# Patient Record
Sex: MALE | Race: WHITE | NOT HISPANIC OR LATINO | Employment: FULL TIME | URBAN - METROPOLITAN AREA
[De-identification: names, ages, dates, MRNs, and addresses within clinical notes are randomized per-mention and may not be internally consistent; named-entity substitution may affect disease eponyms.]

---

## 2024-01-29 ENCOUNTER — OFFICE VISIT (OUTPATIENT)
Dept: CARDIOLOGY | Facility: CLINIC | Age: 34
End: 2024-01-29
Payer: COMMERCIAL

## 2024-01-29 VITALS
SYSTOLIC BLOOD PRESSURE: 138 MMHG | HEART RATE: 82 BPM | HEIGHT: 77 IN | OXYGEN SATURATION: 98 % | WEIGHT: 233.13 LBS | DIASTOLIC BLOOD PRESSURE: 95 MMHG | BODY MASS INDEX: 27.53 KG/M2

## 2024-01-29 DIAGNOSIS — R07.9 CHEST PAIN: Primary | ICD-10-CM

## 2024-01-29 DIAGNOSIS — R07.2 PRECORDIAL PAIN: ICD-10-CM

## 2024-01-29 DIAGNOSIS — R03.0 ELEVATED BP WITHOUT DIAGNOSIS OF HYPERTENSION: ICD-10-CM

## 2024-01-29 DIAGNOSIS — E78.49 OTHER HYPERLIPIDEMIA: ICD-10-CM

## 2024-01-29 PROCEDURE — 1159F MED LIST DOCD IN RCRD: CPT | Mod: CPTII,S$GLB,, | Performed by: NURSE PRACTITIONER

## 2024-01-29 PROCEDURE — 99999 PR PBB SHADOW E&M-EST. PATIENT-LVL IV: CPT | Mod: PBBFAC,,, | Performed by: NURSE PRACTITIONER

## 2024-01-29 PROCEDURE — 3008F BODY MASS INDEX DOCD: CPT | Mod: CPTII,S$GLB,, | Performed by: NURSE PRACTITIONER

## 2024-01-29 PROCEDURE — 93010 ELECTROCARDIOGRAM REPORT: CPT | Mod: S$GLB,,, | Performed by: INTERNAL MEDICINE

## 2024-01-29 PROCEDURE — 3080F DIAST BP >= 90 MM HG: CPT | Mod: CPTII,S$GLB,, | Performed by: NURSE PRACTITIONER

## 2024-01-29 PROCEDURE — 99203 OFFICE O/P NEW LOW 30 MIN: CPT | Mod: S$GLB,,, | Performed by: NURSE PRACTITIONER

## 2024-01-29 PROCEDURE — 3075F SYST BP GE 130 - 139MM HG: CPT | Mod: CPTII,S$GLB,, | Performed by: NURSE PRACTITIONER

## 2024-01-29 PROCEDURE — 93005 ELECTROCARDIOGRAM TRACING: CPT | Mod: S$GLB,,, | Performed by: NURSE PRACTITIONER

## 2024-01-29 PROCEDURE — 1160F RVW MEDS BY RX/DR IN RCRD: CPT | Mod: CPTII,S$GLB,, | Performed by: NURSE PRACTITIONER

## 2024-01-29 RX ORDER — ROSUVASTATIN CALCIUM 10 MG/1
10 TABLET, COATED ORAL DAILY
COMMUNITY

## 2024-01-29 RX ORDER — FENOFIBRATE 160 MG/1
160 TABLET ORAL DAILY
COMMUNITY
End: 2024-06-17

## 2024-01-29 NOTE — PROGRESS NOTES
Cardiology    1/29/2024  1:46 PM    Problem list  There is no problem list on file for this patient.      CC:  Chest pain    HPI:  Started on fenofibrate in September of last year and has noticed that chest pain has started when thirsty. Concerned about current meds leading to DM.  One grandfather has DM.  Father had double bypass in 50s. Hyperlipidemia on both sides of family. Fenofibrate was to assist with triglyceride control. Is on a low cholesterol diet.  Has increased fiber in diet.  Not very active. No tobacco, rare ETOH. Concerned about stomach pain and concerned about liver issues.  Had some mild aches that improved.  Had tests that did not suggest rhabdo.  No stress test in the past. BP has always been high end normal.    Medications  Current Outpatient Medications   Medication Sig Dispense Refill    fenofibrate 160 MG Tab Take 160 mg by mouth once daily.      rosuvastatin (CRESTOR) 10 MG tablet Take 10 mg by mouth once daily.       No current facility-administered medications for this visit.      Prior to Admission medications    Medication Sig Start Date End Date Taking? Authorizing Provider   fenofibrate 160 MG Tab Take 160 mg by mouth once daily.   Yes Provider, Historical   rosuvastatin (CRESTOR) 10 MG tablet Take 10 mg by mouth once daily.   Yes Provider, Historical         History  No past medical history on file.  No past surgical history on file.  Social History     Socioeconomic History    Marital status: Single   Occupational History     Employer: hospitals   Tobacco Use    Smoking status: Never    Smokeless tobacco: Never   Substance and Sexual Activity    Alcohol use: No    Drug use: No     Social Determinants of Health     Financial Resource Strain: Low Risk  (1/29/2024)    Overall Financial Resource Strain (CARDIA)     Difficulty of Paying Living Expenses: Not hard at all   Food Insecurity: No Food Insecurity (1/29/2024)    Hunger Vital Sign     Worried About Running Out of Food in the  Last Year: Never true     Ran Out of Food in the Last Year: Never true   Transportation Needs: No Transportation Needs (1/29/2024)    PRAPARE - Transportation     Lack of Transportation (Medical): No     Lack of Transportation (Non-Medical): No   Physical Activity: Insufficiently Active (1/29/2024)    Exercise Vital Sign     Days of Exercise per Week: 2 days     Minutes of Exercise per Session: 30 min   Stress: No Stress Concern Present (1/29/2024)    [employer] [place] [employer] - Occupational Stress Questionnaire     Feeling of Stress : Not at all   Social Connections: Unknown (1/29/2024)    Social Connection and Isolation Panel [NHANES]     Frequency of Communication with Friends and Family: More than three times a week     Frequency of Social Gatherings with Friends and Family: More than three times a week     Active Member of Clubs or Organizations: No     Attends Club or Organization Meetings: Never     Marital Status: Living with partner   Housing Stability: Low Risk  (1/29/2024)    Housing Stability Vital Sign     Unable to Pay for Housing in the Last Year: No     Number of Places Lived in the Last Year: 2     Unstable Housing in the Last Year: No         Allergies  Review of patient's allergies indicates:  No Known Allergies      Review of Systems   Review of Systems   Constitutional: Negative for diaphoresis and malaise/fatigue.   HENT: Negative.     Cardiovascular:  Positive for chest pain (left sided, does not travel). Negative for claudication, dyspnea on exertion, irregular heartbeat, leg swelling, near-syncope, orthopnea (sleeps on one pillow), palpitations, paroxysmal nocturnal dyspnea and syncope.   Respiratory:  Negative for shortness of breath.    Endocrine: Positive for polydipsia. Negative for polyphagia and polyuria.   Hematologic/Lymphatic: Does not bruise/bleed easily.   Gastrointestinal:  Negative for bloating, nausea and vomiting.   Genitourinary: Negative.    Neurological:   Positive for dizziness (sometimes associated with thirst). Negative for excessive daytime sleepiness, light-headedness, loss of balance and weakness.   Psychiatric/Behavioral:  The patient is not nervous/anxious.    Allergic/Immunologic: Negative.          Physical Exam  Wt Readings from Last 1 Encounters:   01/29/24 105.7 kg (233 lb 2.2 oz)     BP Readings from Last 3 Encounters:   01/29/24 (!) 138/95   12/27/23 133/83   09/05/13 131/84     Pulse Readings from Last 1 Encounters:   01/29/24 82     Body mass index is 27.65 kg/m².    Physical Exam  Vitals and nursing note reviewed.   Constitutional:       Appearance: Normal appearance.   HENT:      Head: Normocephalic and atraumatic.      Mouth/Throat:      Mouth: Mucous membranes are moist.   Eyes:      Pupils: Pupils are equal, round, and reactive to light.   Cardiovascular:      Rate and Rhythm: Normal rate and regular rhythm.      Pulses:           Radial pulses are 2+ on the right side and 2+ on the left side.        Dorsalis pedis pulses are 2+ on the right side and 2+ on the left side.        Posterior tibial pulses are 2+ on the right side and 2+ on the left side.      Heart sounds: No murmur heard.  Pulmonary:      Effort: Pulmonary effort is normal. No respiratory distress.      Breath sounds: Normal breath sounds.   Abdominal:      General: There is no distension.      Tenderness: There is no abdominal tenderness.   Musculoskeletal:      Cervical back: Normal range of motion.      Right lower leg: No edema.      Left lower leg: No edema.   Skin:     General: Skin is warm and dry.      Findings: No erythema.   Neurological:      General: No focal deficit present.      Mental Status: He is alert.   Psychiatric:         Mood and Affect: Mood normal.         Behavior: Behavior normal.             Problem List Items Addressed This Visit          Cardiac/Vascular    Precordial pain    Overview     Noted with stress and increased thirst  Has significant cardiac  history in family  History of hyperlipidemia  Await exercise stress  Labs to assist with risk stratification           Current Assessment & Plan     As above         Other hyperlipidemia    Overview     On fenofibrate and rosuvastatin  Unknown lipid baseline  Labs to assess and r/o any DM  Encourage low chol diet           Current Assessment & Plan     As above         Elevated BP without diagnosis of hypertension    Overview     Encourage low Na diet  Monitor BP  May need BP medications at NOV         Current Assessment & Plan     As above          Other Visit Diagnoses       Chest pain    -  Primary    Relevant Orders    IN OFFICE EKG 12-LEAD (to Muse)    HEMOGLOBIN A1C    LIPID PANEL    CBC W/ AUTO DIFFERENTIAL    COMPREHENSIVE METABOLIC PANEL    Exercise Stress - EKG    TSH    T4, FREE                @Erica Raygoza, DNP

## 2024-01-30 PROBLEM — R03.0 ELEVATED BP WITHOUT DIAGNOSIS OF HYPERTENSION: Status: ACTIVE | Noted: 2024-01-30

## 2024-01-30 PROBLEM — E78.49 OTHER HYPERLIPIDEMIA: Status: ACTIVE | Noted: 2024-01-30

## 2024-06-17 ENCOUNTER — TELEPHONE (OUTPATIENT)
Dept: SLEEP MEDICINE | Facility: CLINIC | Age: 34
End: 2024-06-17
Payer: COMMERCIAL

## 2024-06-17 ENCOUNTER — OFFICE VISIT (OUTPATIENT)
Dept: PRIMARY CARE CLINIC | Facility: CLINIC | Age: 34
End: 2024-06-17
Payer: COMMERCIAL

## 2024-06-17 VITALS
HEIGHT: 77 IN | BODY MASS INDEX: 27.1 KG/M2 | HEART RATE: 96 BPM | TEMPERATURE: 98 F | OXYGEN SATURATION: 98 % | RESPIRATION RATE: 16 BRPM | DIASTOLIC BLOOD PRESSURE: 90 MMHG | WEIGHT: 229.5 LBS | SYSTOLIC BLOOD PRESSURE: 120 MMHG

## 2024-06-17 DIAGNOSIS — Z76.89 ENCOUNTER TO ESTABLISH CARE: Primary | ICD-10-CM

## 2024-06-17 DIAGNOSIS — R06.81 APNEIC EPISODE: ICD-10-CM

## 2024-06-17 DIAGNOSIS — G47.00 FREQUENT NOCTURNAL AWAKENING: ICD-10-CM

## 2024-06-17 DIAGNOSIS — M25.551 RIGHT HIP PAIN: ICD-10-CM

## 2024-06-17 DIAGNOSIS — I10 HYPERTENSION, UNSPECIFIED TYPE: ICD-10-CM

## 2024-06-17 DIAGNOSIS — R06.83 SNORING: ICD-10-CM

## 2024-06-17 PROCEDURE — 3044F HG A1C LEVEL LT 7.0%: CPT | Mod: CPTII,S$GLB,, | Performed by: STUDENT IN AN ORGANIZED HEALTH CARE EDUCATION/TRAINING PROGRAM

## 2024-06-17 PROCEDURE — 1160F RVW MEDS BY RX/DR IN RCRD: CPT | Mod: CPTII,S$GLB,, | Performed by: STUDENT IN AN ORGANIZED HEALTH CARE EDUCATION/TRAINING PROGRAM

## 2024-06-17 PROCEDURE — 99214 OFFICE O/P EST MOD 30 MIN: CPT | Mod: S$GLB,,, | Performed by: STUDENT IN AN ORGANIZED HEALTH CARE EDUCATION/TRAINING PROGRAM

## 2024-06-17 PROCEDURE — 3074F SYST BP LT 130 MM HG: CPT | Mod: CPTII,S$GLB,, | Performed by: STUDENT IN AN ORGANIZED HEALTH CARE EDUCATION/TRAINING PROGRAM

## 2024-06-17 PROCEDURE — 99999 PR PBB SHADOW E&M-EST. PATIENT-LVL V: CPT | Mod: PBBFAC,,, | Performed by: STUDENT IN AN ORGANIZED HEALTH CARE EDUCATION/TRAINING PROGRAM

## 2024-06-17 PROCEDURE — 3080F DIAST BP >= 90 MM HG: CPT | Mod: CPTII,S$GLB,, | Performed by: STUDENT IN AN ORGANIZED HEALTH CARE EDUCATION/TRAINING PROGRAM

## 2024-06-17 PROCEDURE — 1159F MED LIST DOCD IN RCRD: CPT | Mod: CPTII,S$GLB,, | Performed by: STUDENT IN AN ORGANIZED HEALTH CARE EDUCATION/TRAINING PROGRAM

## 2024-06-17 PROCEDURE — 3008F BODY MASS INDEX DOCD: CPT | Mod: CPTII,S$GLB,, | Performed by: STUDENT IN AN ORGANIZED HEALTH CARE EDUCATION/TRAINING PROGRAM

## 2024-06-17 RX ORDER — AMLODIPINE BESYLATE 5 MG/1
5 TABLET ORAL DAILY
Qty: 90 TABLET | Refills: 3 | Status: SHIPPED | OUTPATIENT
Start: 2024-06-17 | End: 2025-06-17

## 2024-06-17 NOTE — PROGRESS NOTES
Subjective:       Patient ID: Oscar Nagy is a 34 y.o. male.    Chief Complaint: Hypertension, Hip Pain (Right hip/wants ref to PT), and referral for sleep study    HPI:  34 y.o. male presents to [facility] here to establish care and multiple concerns.    Patient reports concerns for ongoing right hip pain and would like a referral to PT at this time. States pain began some point in college in 20s. Thought it was related to his work-outs potentially. Feeels that there is a leg discrepancy. Feels that foot is somewhat caving in. Pain in feet associated. Has never discussed with provider in past.    Onset?: College early 20s.  Progression?: Follows with how much he is exercising. Running makes it much worse. Waxes and wanes, will resolve if not exercising  Inciting incident/new physical activity?: No known  Past trauma/surgery?: None  Recurrent?: N/A  Description?: Gluteal region, feels like it could be his hip socket. Non-stop and persistent discomfort.  Radiates?: No  Severity?: 2/10 at worst  Worsening factors?: Running  Interventions?: Stretching, ice, heat, rest, strength and balance exercises, ibuprofen and tylenol with transient relief.  Did interventions help?: Only rest  History of bariatric surgery, MI, renal disease, or GI bleed/ulcer?: No    Patient reports concerns for elevated blood pressure. States had at recent visit and remaining high on home checks.    HTN Hx?: Last time here was also elevated.  Home BP log?: Diastolic is persistently elevated at home. Has been monitoring last 12 months.  Medications: None  Compliance?: N/A  Diet?: Following Dash now  Exercise?: More weight lifting  Did take a NO2 supplement to attempt to help lower today    Patient reports concerns for possible LESLIE.  Snoring?: Sometimes  Apneic episodes?: Yes  Frequent awakenings?: Yes  Daytime fatigue?: Not much  Strong family Hx of LESLIE in males in family    Last PCP?: Unknown  Allergies: NKDA  Medical History:  "HLD  Medications: rosuvastatin 10 mg  Surgical History: [place] tooth extraction  Family History: Paternal grandfather pancreatic cancer; maternal grandfather dementia; Paternal grandmother with thyroid disease  Social History: Never smoker, EtOH once monthly, THC gummies on weekend on occasion      Review of Systems   Constitutional:  Negative for chills, diaphoresis, fatigue and fever.   HENT:  Negative for congestion, sinus pressure, sneezing and sore throat.    Respiratory:  Negative for cough and shortness of breath.    Cardiovascular:  Positive for chest pain (Occasional, light, can stop with breathing exercises. No chills, swweats, nausea, jaw/arm numbness/weakness. Can occur at rest also.). Negative for palpitations.   Gastrointestinal:  Negative for abdominal pain, diarrhea, nausea and vomiting.   Musculoskeletal:  Positive for arthralgias (Right hip, knee, and foot). Negative for joint swelling and myalgias.   Skin:  Negative for rash and wound.   Neurological:  Negative for dizziness, weakness, light-headedness, numbness and headaches.       Objective:      Vitals:    06/17/24 1319   BP: (!) 120/90   BP Location: Right arm   Patient Position: Sitting   BP Method: Medium (Manual)   Pulse: 96   Resp: 16   Temp: 98.1 °F (36.7 °C)   TempSrc: Oral   SpO2: 98%   Weight: 104.1 kg (229 lb 8 oz)   Height: 6' 5" (1.956 m)     Physical Exam  Vitals reviewed.   Constitutional:       General: He is not in acute distress.     Appearance: Normal appearance. He is not ill-appearing.   HENT:      Head: Normocephalic and atraumatic.      Mouth/Throat:      Mouth: Mucous membranes are moist.      Pharynx: Oropharynx is clear. No oropharyngeal exudate or posterior oropharyngeal erythema.      Comments: Mallampati I with low diameter oral opening  Eyes:      General:         Right eye: No discharge.         Left eye: No discharge.   Cardiovascular:      Rate and Rhythm: Normal rate and regular rhythm.      Pulses: Normal " pulses.      Heart sounds: No murmur heard.  Pulmonary:      Effort: Pulmonary effort is normal.      Breath sounds: Normal breath sounds.   Musculoskeletal:         General: No deformity.      Cervical back: Neck supple. No rigidity.      Comments: FADIR reproducing tightness to right hip, negative to left hip. FADER negative bilaterally.   Lymphadenopathy:      Cervical: No cervical adenopathy.   Skin:     General: Skin is warm and dry.      Coloration: Skin is not jaundiced.   Neurological:      General: No focal deficit present.      Mental Status: He is alert and oriented to person, place, and time.   Psychiatric:         Mood and Affect: Mood normal.         Behavior: Behavior normal.             Lab Results   Component Value Date     02/06/2024    K 3.6 02/06/2024     02/06/2024    CO2 21 (L) 02/06/2024    BUN 15 02/06/2024    CREATININE 1.4 02/06/2024    ANIONGAP 20 (H) 02/06/2024     Lab Results   Component Value Date    HGBA1C 5.0 02/06/2024     Lab Results   Component Value Date    BNP <10 12/27/2023       Lab Results   Component Value Date    WBC 8.51 02/06/2024    HGB 17.5 02/06/2024    HCT 53.8 02/06/2024     02/06/2024    GRAN 5.2 02/06/2024    GRAN 60.4 02/06/2024     Lab Results   Component Value Date    CHOL 177 02/06/2024    HDL 41 02/06/2024    LDLCALC 99.6 02/06/2024    TRIG 182 (H) 02/06/2024          Current Outpatient Medications:     rosuvastatin (CRESTOR) 10 MG tablet, Take 10 mg by mouth once daily., Disp: , Rfl:     amLODIPine (NORVASC) 5 MG tablet, Take 1 tablet (5 mg total) by mouth once daily., Disp: 90 tablet, Rfl: 3        Assessment:       1. Encounter to establish care    2. Right hip pain    3. Snoring    4. Apneic episode    5. Frequent nocturnal awakening    6. Hypertension, unspecified type           Plan:       Encounter to establish care    Right hip pain  -     Ambulatory referral/consult to Physical/Occupational Therapy; Future; Expected date:  06/24/2024  -     X-Ray Hip 2 or 3 views Right with Pelvis when performed; Future; Expected date: 06/17/2024  - RICE therapy recommended  - If pain not improving in 4-6 weeks, consider MRI/CT and referral to Orthopaedics    Snoring  Apneic episode  Frequent nocturnal awakening  Hypertension, unspecified type  -     Ambulatory referral/consult to Sleep Disorders; Future; Expected date: 06/24/2024  -     amLODIPine (NORVASC) 5 MG tablet; Take 1 tablet (5 mg total) by mouth once daily.  Dispense: 90 tablet; Refill: 3  -     Ambulatory referral/consult to ENT; Future; Expected date: 06/24/2024  - Diet and exercise recommendations provided today's visit  - Narrowing to airway with Mallampati III finding on exam    RTC in 3 months

## 2024-06-17 NOTE — TELEPHONE ENCOUNTER
"Staff left a voice message informing Patient that staff would like to schedule an appointment for their referral.            ----- Message from Maya Luna sent at 6/17/2024  2:11 PM CDT -----  Scheduling Request      Patient Status: Np    Scheduling Appt: From referral - Snoring [R06.83]  Apneic episode [R06.81]  Hypertension, unspecified type [I10]        Treating Provider PCP: Oscar Kwok MD in [facility]    Additional Notes:  "Thank you for all that you do for our patients"  "

## 2024-07-08 ENCOUNTER — OFFICE VISIT (OUTPATIENT)
Dept: PRIMARY CARE CLINIC | Facility: CLINIC | Age: 34
End: 2024-07-08
Payer: COMMERCIAL

## 2024-07-08 VITALS
OXYGEN SATURATION: 95 % | BODY MASS INDEX: 26.97 KG/M2 | HEART RATE: 85 BPM | RESPIRATION RATE: 18 BRPM | SYSTOLIC BLOOD PRESSURE: 150 MMHG | HEIGHT: 77 IN | WEIGHT: 228.38 LBS | DIASTOLIC BLOOD PRESSURE: 94 MMHG

## 2024-07-08 DIAGNOSIS — I10 HYPERTENSION, UNSPECIFIED TYPE: ICD-10-CM

## 2024-07-08 DIAGNOSIS — E78.1 HYPERTRIGLYCERIDEMIA: Primary | ICD-10-CM

## 2024-07-08 PROCEDURE — 3077F SYST BP >= 140 MM HG: CPT | Mod: CPTII,S$GLB,, | Performed by: STUDENT IN AN ORGANIZED HEALTH CARE EDUCATION/TRAINING PROGRAM

## 2024-07-08 PROCEDURE — 3080F DIAST BP >= 90 MM HG: CPT | Mod: CPTII,S$GLB,, | Performed by: STUDENT IN AN ORGANIZED HEALTH CARE EDUCATION/TRAINING PROGRAM

## 2024-07-08 PROCEDURE — 3008F BODY MASS INDEX DOCD: CPT | Mod: CPTII,S$GLB,, | Performed by: STUDENT IN AN ORGANIZED HEALTH CARE EDUCATION/TRAINING PROGRAM

## 2024-07-08 PROCEDURE — 1160F RVW MEDS BY RX/DR IN RCRD: CPT | Mod: CPTII,S$GLB,, | Performed by: STUDENT IN AN ORGANIZED HEALTH CARE EDUCATION/TRAINING PROGRAM

## 2024-07-08 PROCEDURE — 99999 PR PBB SHADOW E&M-EST. PATIENT-LVL IV: CPT | Mod: PBBFAC,,, | Performed by: STUDENT IN AN ORGANIZED HEALTH CARE EDUCATION/TRAINING PROGRAM

## 2024-07-08 PROCEDURE — 3044F HG A1C LEVEL LT 7.0%: CPT | Mod: CPTII,S$GLB,, | Performed by: STUDENT IN AN ORGANIZED HEALTH CARE EDUCATION/TRAINING PROGRAM

## 2024-07-08 PROCEDURE — 1159F MED LIST DOCD IN RCRD: CPT | Mod: CPTII,S$GLB,, | Performed by: STUDENT IN AN ORGANIZED HEALTH CARE EDUCATION/TRAINING PROGRAM

## 2024-07-08 PROCEDURE — 99213 OFFICE O/P EST LOW 20 MIN: CPT | Mod: S$GLB,,, | Performed by: STUDENT IN AN ORGANIZED HEALTH CARE EDUCATION/TRAINING PROGRAM

## 2024-07-08 RX ORDER — AMLODIPINE BESYLATE 10 MG/1
10 TABLET ORAL DAILY
Qty: 90 TABLET | Refills: 3 | Status: SHIPPED | OUTPATIENT
Start: 2024-07-08 | End: 2025-07-08

## 2024-07-08 NOTE — PROGRESS NOTES
"Subjective:       Patient ID: Oscar Nagy is a 34 y.o. male.    Chief Complaint: Establish Care    HPI:  34 y.o. male presents to [facility] for follow-up visit    Acute concerns?: Patient concerns for BP elevation today    Has been off of fenofibrate past 2-3 months now.    HTN:  Home BP log?: 130-140s systolic, diastolic 90s-low 100s  Medications: amlodipine 5 mg  Compliance?: Not missing    Diet?: Trying to do mediterranean  Exercise?: Bought a jump rope    Last blood work 2/6/2024    Patient reports hip pain has been improving some with stretches. Postol squat.     Home cuff has been validated and can reach out for home BP in future.      Review of Systems   Constitutional:  Negative for chills, diaphoresis, fatigue and fever.   HENT:  Negative for congestion, sinus pressure, sneezing and sore throat.    Respiratory:  Negative for cough and shortness of breath.    Cardiovascular:  Negative for chest pain and palpitations.   Gastrointestinal:  Negative for abdominal pain, diarrhea, nausea and vomiting.   Musculoskeletal:  Negative for joint swelling and myalgias.   Neurological:  Negative for dizziness and weakness.       Objective:      Vitals:    07/08/24 1323   BP: (!) 160/94   BP Location: Right arm   Patient Position: Sitting   BP Method: Medium (Manual)   Pulse: 85   Resp: 18   SpO2: 95%   Weight: 103.6 kg (228 lb 6.3 oz)   Height: 6' 5" (1.956 m)     Physical Exam  Vitals reviewed.   Constitutional:       General: He is not in acute distress.     Appearance: Normal appearance. He is not ill-appearing.   HENT:      Head: Normocephalic and atraumatic.   Eyes:      General:         Right eye: No discharge.         Left eye: No discharge.      Conjunctiva/sclera: Conjunctivae normal.   Cardiovascular:      Rate and Rhythm: Normal rate.   Pulmonary:      Effort: Pulmonary effort is normal.   Musculoskeletal:         General: No deformity.   Skin:     Coloration: Skin is not jaundiced or pale. "   Neurological:      General: No focal deficit present.      Mental Status: He is alert and oriented to person, place, and time.   Psychiatric:         Mood and Affect: Mood normal.         Behavior: Behavior normal.             Lab Results   Component Value Date     02/06/2024    K 3.6 02/06/2024     02/06/2024    CO2 21 (L) 02/06/2024    BUN 15 02/06/2024    CREATININE 1.4 02/06/2024    ANIONGAP 20 (H) 02/06/2024     Lab Results   Component Value Date    HGBA1C 5.0 02/06/2024     Lab Results   Component Value Date    BNP <10 12/27/2023       Lab Results   Component Value Date    WBC 8.51 02/06/2024    HGB 17.5 02/06/2024    HCT 53.8 02/06/2024     02/06/2024    GRAN 5.2 02/06/2024    GRAN 60.4 02/06/2024     Lab Results   Component Value Date    CHOL 177 02/06/2024    HDL 41 02/06/2024    LDLCALC 99.6 02/06/2024    TRIG 182 (H) 02/06/2024          Current Outpatient Medications:     rosuvastatin (CRESTOR) 10 MG tablet, Take 10 mg by mouth once daily., Disp: , Rfl:     amLODIPine (NORVASC) 10 MG tablet, Take 1 tablet (10 mg total) by mouth once daily., Disp: 90 tablet, Rfl: 3        Assessment:       1. Hypertriglyceridemia    2. Hypertension, unspecified type           Plan:       Hypertension, unspecified type  Hypertriglyceridemia  -     TRIGLYCERIDES; Future; Expected date: 07/08/2024  -     amLODIPine (NORVASC) 10 MG tablet; Take 1 tablet (10 mg total) by mouth once daily.  Dispense: 90 tablet; Refill: 3  - Home BP remedies provided today's visit    RTC in 3 months

## 2024-07-15 DIAGNOSIS — E78.1 HYPERTRIGLYCERIDEMIA: Primary | ICD-10-CM

## 2024-07-15 RX ORDER — ROSUVASTATIN CALCIUM 10 MG/1
10 TABLET, COATED ORAL DAILY
Qty: 90 TABLET | Refills: 3 | Status: SHIPPED | OUTPATIENT
Start: 2024-07-15

## 2024-09-19 ENCOUNTER — PATIENT MESSAGE (OUTPATIENT)
Dept: PRIMARY CARE CLINIC | Facility: CLINIC | Age: 34
End: 2024-09-19
Payer: COMMERCIAL

## 2024-09-19 ENCOUNTER — PATIENT OUTREACH (OUTPATIENT)
Dept: ADMINISTRATIVE | Facility: HOSPITAL | Age: 34
End: 2024-09-19
Payer: COMMERCIAL

## 2024-09-19 ENCOUNTER — PATIENT MESSAGE (OUTPATIENT)
Dept: ADMINISTRATIVE | Facility: HOSPITAL | Age: 34
End: 2024-09-19
Payer: COMMERCIAL

## 2024-09-19 VITALS — SYSTOLIC BLOOD PRESSURE: 117 MMHG | DIASTOLIC BLOOD PRESSURE: 78 MMHG

## 2024-09-19 NOTE — PROGRESS NOTES
Health Maintenance Due   Topic Date Due    HIV Screening  Never done    Influenza Vaccine (1) Never done    COVID-19 Vaccine (1 - 2023-24 season) Never done     Immunizations - reviewed and updated   Care Everywhere - triggered   Care Teams - updated   Outreach - Remote BP reading reported by PCP. /78, documented in flowsheets

## 2024-10-04 ENCOUNTER — OFFICE VISIT (OUTPATIENT)
Dept: PRIMARY CARE CLINIC | Facility: CLINIC | Age: 34
End: 2024-10-04
Payer: COMMERCIAL

## 2024-10-04 VITALS
DIASTOLIC BLOOD PRESSURE: 79 MMHG | BODY MASS INDEX: 26.22 KG/M2 | SYSTOLIC BLOOD PRESSURE: 109 MMHG | WEIGHT: 221.13 LBS | HEART RATE: 81 BPM | OXYGEN SATURATION: 99 %

## 2024-10-04 DIAGNOSIS — Z00.00 ANNUAL PHYSICAL EXAM: Primary | ICD-10-CM

## 2024-10-04 DIAGNOSIS — Z23 NEED FOR VACCINATION: ICD-10-CM

## 2024-10-04 DIAGNOSIS — Z51.81 MEDICATION MONITORING ENCOUNTER: ICD-10-CM

## 2024-10-04 DIAGNOSIS — Z13.6 ENCOUNTER FOR SCREENING FOR CARDIOVASCULAR DISORDERS: ICD-10-CM

## 2024-10-04 DIAGNOSIS — I10 HYPERTENSION, UNSPECIFIED TYPE: ICD-10-CM

## 2024-10-04 PROCEDURE — 90656 IIV3 VACC NO PRSV 0.5 ML IM: CPT | Mod: S$GLB,,, | Performed by: STUDENT IN AN ORGANIZED HEALTH CARE EDUCATION/TRAINING PROGRAM

## 2024-10-04 PROCEDURE — 3074F SYST BP LT 130 MM HG: CPT | Mod: CPTII,S$GLB,, | Performed by: STUDENT IN AN ORGANIZED HEALTH CARE EDUCATION/TRAINING PROGRAM

## 2024-10-04 PROCEDURE — 99999 PR PBB SHADOW E&M-EST. PATIENT-LVL III: CPT | Mod: PBBFAC,,, | Performed by: STUDENT IN AN ORGANIZED HEALTH CARE EDUCATION/TRAINING PROGRAM

## 2024-10-04 PROCEDURE — 1160F RVW MEDS BY RX/DR IN RCRD: CPT | Mod: CPTII,S$GLB,, | Performed by: STUDENT IN AN ORGANIZED HEALTH CARE EDUCATION/TRAINING PROGRAM

## 2024-10-04 PROCEDURE — 1159F MED LIST DOCD IN RCRD: CPT | Mod: CPTII,S$GLB,, | Performed by: STUDENT IN AN ORGANIZED HEALTH CARE EDUCATION/TRAINING PROGRAM

## 2024-10-04 PROCEDURE — 3008F BODY MASS INDEX DOCD: CPT | Mod: CPTII,S$GLB,, | Performed by: STUDENT IN AN ORGANIZED HEALTH CARE EDUCATION/TRAINING PROGRAM

## 2024-10-04 PROCEDURE — 3044F HG A1C LEVEL LT 7.0%: CPT | Mod: CPTII,S$GLB,, | Performed by: STUDENT IN AN ORGANIZED HEALTH CARE EDUCATION/TRAINING PROGRAM

## 2024-10-04 PROCEDURE — 3078F DIAST BP <80 MM HG: CPT | Mod: CPTII,S$GLB,, | Performed by: STUDENT IN AN ORGANIZED HEALTH CARE EDUCATION/TRAINING PROGRAM

## 2024-10-04 PROCEDURE — 90471 IMMUNIZATION ADMIN: CPT | Mod: S$GLB,,, | Performed by: STUDENT IN AN ORGANIZED HEALTH CARE EDUCATION/TRAINING PROGRAM

## 2024-10-04 PROCEDURE — 99395 PREV VISIT EST AGE 18-39: CPT | Mod: 25,S$GLB,, | Performed by: STUDENT IN AN ORGANIZED HEALTH CARE EDUCATION/TRAINING PROGRAM

## 2024-10-04 NOTE — PROGRESS NOTES
Subjective:       Patient ID: Oscar Nagy is a 34 y.o. male.    Chief Complaint: Follow-up    HPI:  34 y.o. male presents to [facility] here for 3 month follow-up visit    Acute concerns?: Patient reports has been doing very well on amlodipine.    HTN:  Home BP log?: 120s diastolic most, 70s-80s diastolic  Medications: amlodipine 10 mg  Compliance?: Not missing    Diet?: Trying to eat Blue Zone diet and eating more of these foods  Exercise?: Jump rope, has been slacking some    Amenable to flu vaccine today      Review of Systems   Constitutional:  Negative for chills, diaphoresis, fatigue and fever.   HENT:  Negative for congestion, sinus pressure, sneezing and sore throat.    Respiratory:  Negative for cough and shortness of breath.    Cardiovascular:  Negative for chest pain and palpitations.   Gastrointestinal:  Negative for abdominal pain, diarrhea, nausea and vomiting.   Musculoskeletal:  Negative for joint swelling and myalgias.   Neurological:  Negative for dizziness and weakness.     Objective:      Vitals:    10/04/24 1458   BP: 109/79   BP Location: Left arm   Patient Position: Sitting   Pulse: 81   SpO2: 99%   Weight: 100.3 kg (221 lb 1.9 oz)     Physical Exam  Vitals reviewed.   Constitutional:       General: He is not in acute distress.     Appearance: Normal appearance. He is not ill-appearing.   HENT:      Head: Normocephalic and atraumatic.   Eyes:      General:         Right eye: No discharge.         Left eye: No discharge.      Conjunctiva/sclera: Conjunctivae normal.   Cardiovascular:      Rate and Rhythm: Normal rate and regular rhythm.      Pulses: Normal pulses.      Heart sounds: No murmur heard.  Pulmonary:      Effort: Pulmonary effort is normal.      Breath sounds: Normal breath sounds.   Musculoskeletal:         General: No deformity.      Cervical back: Neck supple. No rigidity.   Lymphadenopathy:      Cervical: No cervical adenopathy.   Skin:     General: Skin is warm and dry.       Coloration: Skin is not jaundiced or pale.   Neurological:      General: No focal deficit present.      Mental Status: He is alert and oriented to person, place, and time.   Psychiatric:         Mood and Affect: Mood normal.         Behavior: Behavior normal.             Lab Results   Component Value Date     02/06/2024    K 3.6 02/06/2024     02/06/2024    CO2 21 (L) 02/06/2024    BUN 15 02/06/2024    CREATININE 1.4 02/06/2024    ANIONGAP 20 (H) 02/06/2024     Lab Results   Component Value Date    HGBA1C 5.0 02/06/2024     Lab Results   Component Value Date    BNP <10 12/27/2023       Lab Results   Component Value Date    WBC 8.51 02/06/2024    HGB 17.5 02/06/2024    HCT 53.8 02/06/2024     02/06/2024    GRAN 5.2 02/06/2024    GRAN 60.4 02/06/2024     Lab Results   Component Value Date    CHOL 177 02/06/2024    HDL 41 02/06/2024    LDLCALC 99.6 02/06/2024    TRIG 153 (H) 07/08/2024          Current Outpatient Medications:     amLODIPine (NORVASC) 10 MG tablet, Take 1 tablet (10 mg total) by mouth once daily., Disp: 90 tablet, Rfl: 3    rosuvastatin (CRESTOR) 10 MG tablet, Take 1 tablet (10 mg total) by mouth once daily., Disp: 90 tablet, Rfl: 3    Current Facility-Administered Medications:     influenza (Flulaval, Fluzone, Fluarix) 45 mcg/0.5 mL IM vaccine (> or = 6 mo) 0.5 mL, 0.5 mL, Intramuscular, 1 time in Clinic/HOD,         Assessment:       1. Annual physical exam    2. Hypertension, unspecified type    3. Medication monitoring encounter    4. Encounter for screening for cardiovascular disorders    5. Need for vaccination           Plan:       Annual physical exam  Hypertension, unspecified type  Medication monitoring encounter  -     CBC Auto Differential; Future; Expected date: 10/04/2024  -     Comprehensive Metabolic Panel; Future; Expected date: 10/04/2024  - Patient doing well with current interventions.    Encounter for screening for cardiovascular disorders  -     Lipid Panel;  Future; Expected date: 10/04/2024    Need for vaccination  -     influenza (Flulaval, Fluzone, Fluarix) 45 mcg/0.5 mL IM vaccine (> or = 6 mo) 0.5 mL    F/u in 1 year

## 2024-10-04 NOTE — PROGRESS NOTES
Verified pt by name and . NKDA. Per physician orders pt was administered FLUARIX IM to right arm using aseptic technique. Pt tolerated well. No adverse effects or pain reported. MD notified.

## 2024-10-15 ENCOUNTER — PATIENT MESSAGE (OUTPATIENT)
Dept: PRIMARY CARE CLINIC | Facility: CLINIC | Age: 34
End: 2024-10-15
Payer: COMMERCIAL

## 2024-10-15 DIAGNOSIS — Z13.6 ENCOUNTER FOR SCREENING FOR CARDIOVASCULAR DISORDERS: Primary | ICD-10-CM

## 2024-12-03 ENCOUNTER — OFFICE VISIT (OUTPATIENT)
Dept: OTOLARYNGOLOGY | Facility: CLINIC | Age: 34
End: 2024-12-03
Payer: COMMERCIAL

## 2024-12-03 VITALS
HEART RATE: 67 BPM | WEIGHT: 223.13 LBS | DIASTOLIC BLOOD PRESSURE: 93 MMHG | SYSTOLIC BLOOD PRESSURE: 147 MMHG | BODY MASS INDEX: 26.46 KG/M2

## 2024-12-03 DIAGNOSIS — J34.829 INCOMPETENT NASAL VALVE: ICD-10-CM

## 2024-12-03 DIAGNOSIS — J34.3 HYPERTROPHY OF INFERIOR NASAL TURBINATE: ICD-10-CM

## 2024-12-03 DIAGNOSIS — G47.30 SLEEP-DISORDERED BREATHING: ICD-10-CM

## 2024-12-03 DIAGNOSIS — J34.2 DEVIATED NASAL SEPTUM: ICD-10-CM

## 2024-12-03 DIAGNOSIS — J34.89 NASAL OBSTRUCTION WITHOUT CHOANAL ATRESIA: Primary | ICD-10-CM

## 2024-12-03 PROCEDURE — 3077F SYST BP >= 140 MM HG: CPT | Mod: CPTII,S$GLB,, | Performed by: PHYSICIAN ASSISTANT

## 2024-12-03 PROCEDURE — 3080F DIAST BP >= 90 MM HG: CPT | Mod: CPTII,S$GLB,, | Performed by: PHYSICIAN ASSISTANT

## 2024-12-03 PROCEDURE — 31231 NASAL ENDOSCOPY DX: CPT | Mod: S$GLB,,, | Performed by: PHYSICIAN ASSISTANT

## 2024-12-03 PROCEDURE — 3044F HG A1C LEVEL LT 7.0%: CPT | Mod: CPTII,S$GLB,, | Performed by: PHYSICIAN ASSISTANT

## 2024-12-03 PROCEDURE — 3008F BODY MASS INDEX DOCD: CPT | Mod: CPTII,S$GLB,, | Performed by: PHYSICIAN ASSISTANT

## 2024-12-03 PROCEDURE — 1159F MED LIST DOCD IN RCRD: CPT | Mod: CPTII,S$GLB,, | Performed by: PHYSICIAN ASSISTANT

## 2024-12-03 PROCEDURE — 99204 OFFICE O/P NEW MOD 45 MIN: CPT | Mod: 25,S$GLB,, | Performed by: PHYSICIAN ASSISTANT

## 2024-12-03 PROCEDURE — 99999 PR PBB SHADOW E&M-EST. PATIENT-LVL III: CPT | Mod: PBBFAC,,, | Performed by: PHYSICIAN ASSISTANT

## 2024-12-03 RX ORDER — FLUTICASONE PROPIONATE 50 MCG
2 SPRAY, SUSPENSION (ML) NASAL DAILY
Qty: 16 G | Refills: 11 | Status: SHIPPED | OUTPATIENT
Start: 2024-12-03 | End: 2025-12-03

## 2024-12-03 NOTE — PROCEDURES
"Nasal/sinus endoscopy    Date/Time: 12/3/2024 3:30 PM    Time out: Immediately prior to procedure a "time out" was called to verify the correct patient, procedure, equipment, support staff and site/side marked as required.    Performed by: Lan Saleh PA-C  Authorized by: Lan Saleh PA-C    Consent Done?:  Yes (Verbal)  Anesthesia:     Local anesthetic:  Lidocaine 2% and Jovanny-Synephrine 1/2%    Type of Endoscope:  Flexible    Patient tolerance:  Patient tolerated the procedure well with no immediate complications  Nose:     Procedure Performed:  Nasal Endoscopy  External:      No external nasal deformity  Intranasal:      Mucosa no polyps     No mucosa lesions present     Enlarged turbinates     Septum gross deformity (LEFT deviation)  Nasopharynx:      No mucosa lesions     Adenoids present     Posterior choanae patent     Eustachian tube patent    "

## 2024-12-03 NOTE — PROGRESS NOTES
Subjective:     HPI: Oscar Nagy is a 34 y.o. male who was referred to me by Dr. Oscar Kwok in consultation for nasal obstruction.    Patient reports chronic left-sided nasal obstruction with associated hyposmia for as long as he can remember.  He reports usually able to smell most smells but at times people nearby or able to smell certain odors that he can not.  Patient has not tried any nasal sprays in the past and denies any trauma to his nose.  Patient does not suffer from rhinorrhea or sneezing on a regular basis.  He is tried breathe right strips in the past with mild improvement of symptoms.    He has a history of snoring and excessive daytime fatigue.  Sleep study ordered on 8/9/24 by Dr. Sen Metz and he reports results were negative for LESLIE.    Current sinonasal medications include none at present.     He denies a diagnosis of obstructive sleep apnea.   He does not recall a prior history of nasal trauma.  He has not had sinonasal surgery.        Past Medical/Past Surgical History  No past medical history on file.  He has no past surgical history on file.    Family History/Social History  His family history includes Arthritis in his maternal grandmother; Cancer in his paternal grandfather; Diabetes in his paternal grandfather; Heart disease in his maternal grandmother; Hyperlipidemia in his maternal grandfather and paternal grandfather; Hypertension in his maternal grandfather and paternal grandfather; Stroke in his maternal grandmother.  He reports that he has never smoked. He has never used smokeless tobacco. He reports current drug use. Drug: Marijuana. He reports that he does not drink alcohol.    Allergies/Immunizations  He has No Known Allergies.  Immunization History   Administered Date(s) Administered    Hepatitis B, Pediatric/Adolescent 11/19/1999, 02/04/2000, 04/14/2000    Influenza - Trivalent - Fluarix, Flulaval, Fluzone, Afluria - PF 10/04/2024    Tdap 07/08/2021        Medications  "  amLODIPine  rosuvastatin     Review of Systems   HENT: Positive for stuffy nose.      Respiratory:  Positive for snoring. Negative for sleep apnea.            Objective:     BP (!) 147/93 (BP Location: Left arm, Patient Position: Sitting)   Pulse 67   Wt 101.2 kg (223 lb 1.7 oz)   BMI 26.46 kg/m²      Physical Exam  Vitals reviewed.   Constitutional:       Appearance: Normal appearance.   HENT:      Head: Normocephalic and atraumatic.      Right Ear: External ear normal.      Left Ear: External ear normal.      Nose: Septal deviation (LEFT) present.      Right Turbinates: Enlarged.      Left Turbinates: Enlarged.      Right Sinus: No maxillary sinus tenderness or frontal sinus tenderness.      Left Sinus: No maxillary sinus tenderness or frontal sinus tenderness.      Comments: Positive LEFT modified Cottles without gross external valve collapse.     Mouth/Throat:      Lips: Pink.      Mouth: Mucous membranes are moist.      Pharynx: Oropharynx is clear. Uvula midline.      Tonsils: No tonsillar exudate or tonsillar abscesses.   Eyes:      Conjunctiva/sclera: Conjunctivae normal.   Pulmonary:      Effort: Pulmonary effort is normal.   Neurological:      Mental Status: He is alert.          Procedure    Nasal endoscopy performed.  See procedure note.    Data Reviewed  I personally reviewed the chart, including any outside records, and pertinent data below:    I reviewed the following notes Internal Medicine     WBC (K/uL)   Date Value   02/06/2024 8.51     Eosinophil % (%)   Date Value   02/06/2024 1.1     Eos # (K/uL)   Date Value   02/06/2024 0.1     Platelets (K/uL)   Date Value   02/06/2024 299     Glucose (mg/dL)   Date Value   02/06/2024 92     No results found for: "IGE"    No sinus imaging available.    Assessment & Plan:     1. Nasal obstruction without choanal atresia  3. Deviated nasal septum  4. Hypertrophy of inferior nasal turbinate  5. Incompetent nasal valve  - nasal endoscopy without evidence of " polyps, masses, or purulence.  Significant left deviation noted.  - Prescribed patient to START fluticasone propionate and also educated patient on how to properly use nasal sprays   - briefly discussed surgical intervention   2. Sleep-disordered breathing  -     negative sleep test for LESLIE per patient  - Encouraged obtained results and uploading to chart    He will follow up as needed  I had a discussion with the patient regarding his condition and the further workup and management options.    All questions were answered, and the patient is in agreement with the above.     Disclaimer:  This note may have been prepared utilizing voice recognition software which may result in occasional typographical errors in the text such as sound alike words.   If further clarification is needed, please contact the ENT department of [facility].

## 2025-01-09 ENCOUNTER — HOSPITAL ENCOUNTER (OUTPATIENT)
Dept: RADIOLOGY | Facility: HOSPITAL | Age: 35
Discharge: HOME | End: 2025-01-09
Attending: PHYSICAL MEDICINE & REHABILITATION
Payer: COMMERCIAL

## 2025-01-09 ENCOUNTER — OFFICE VISIT (OUTPATIENT)
Dept: PHYSICAL MEDICINE AND REHAB | Facility: CLINIC | Age: 35
End: 2025-01-09
Payer: COMMERCIAL

## 2025-01-09 VITALS — HEIGHT: 77 IN | OXYGEN SATURATION: 98 % | BODY MASS INDEX: 26.43 KG/M2 | WEIGHT: 223.88 LBS

## 2025-01-09 DIAGNOSIS — G89.29 CHRONIC RIGHT-SIDED LOW BACK PAIN WITHOUT SCIATICA: ICD-10-CM

## 2025-01-09 DIAGNOSIS — M54.50 CHRONIC RIGHT-SIDED LOW BACK PAIN WITHOUT SCIATICA: ICD-10-CM

## 2025-01-09 DIAGNOSIS — M62.89 HAMSTRING TIGHTNESS OF RIGHT LOWER EXTREMITY: ICD-10-CM

## 2025-01-09 DIAGNOSIS — G89.29 CHRONIC RIGHT HIP PAIN: Primary | ICD-10-CM

## 2025-01-09 DIAGNOSIS — M25.551 CHRONIC RIGHT HIP PAIN: Primary | ICD-10-CM

## 2025-01-09 PROCEDURE — 99999 PR PBB SHADOW E&M-EST. PATIENT-LVL III: CPT | Mod: PBBFAC,,, | Performed by: PHYSICAL MEDICINE & REHABILITATION

## 2025-01-09 PROCEDURE — 3008F BODY MASS INDEX DOCD: CPT | Mod: CPTII,S$GLB,, | Performed by: PHYSICAL MEDICINE & REHABILITATION

## 2025-01-09 PROCEDURE — 72110 X-RAY EXAM L-2 SPINE 4/>VWS: CPT | Mod: 26,,, | Performed by: RADIOLOGY

## 2025-01-09 PROCEDURE — 99204 OFFICE O/P NEW MOD 45 MIN: CPT | Mod: S$GLB,,, | Performed by: PHYSICAL MEDICINE & REHABILITATION

## 2025-01-09 PROCEDURE — 72110 X-RAY EXAM L-2 SPINE 4/>VWS: CPT | Mod: TC

## 2025-01-09 PROCEDURE — 1159F MED LIST DOCD IN RCRD: CPT | Mod: CPTII,S$GLB,, | Performed by: PHYSICAL MEDICINE & REHABILITATION

## 2025-01-09 RX ORDER — ACETAMINOPHEN 500 MG
500-1000 TABLET ORAL 3 TIMES DAILY PRN
COMMUNITY
Start: 2025-01-09

## 2025-01-09 RX ORDER — MELOXICAM 15 MG/1
15 TABLET ORAL DAILY
Qty: 30 TABLET | Refills: 1 | Status: SHIPPED | OUTPATIENT
Start: 2025-01-09 | End: 2025-02-08

## 2025-01-09 NOTE — PROGRESS NOTES
Subjective:       Patient ID: Oscar Nagy is a 34 y.o. male.    Chief Complaint: No chief complaint on file.      HPI      Mr. Nagy is a 34-year-old male with unremarkable past medical history except for hypertension.  He was referred to the Physical Medicine Clinic for chronic right hip and buttock pain.  The patient reports he has been complaining of right hip pain for about 8-9 years.  He denies any history of apparent injuries but reports used to do weight lifting in college.  His pain has been persistent since.  He was evaluated by his Primary Care Provider on 6/17/2024.  X-rays of the right hip were obtained and showed no abnormalities (including no degenerative changes, no fractures, no misalignment).    His pain is in the right buttock and hip region.  It is an intermittent pain described as soreness and tightness.  It is aggravated by running, weightlifting.  It is better with sitting down, lying down, stretching and hot shower.  His maximum pain is 3-4/10 and minimum 0/10.  Today it is 1/10.  The patient complains of occasional associated lumbar pain.  He occasionally feels the pain in his right knee.  He has occasional limping after running for too long.  He denies any lower extremity weakness or numbness.  He denies any bowel or bladder incontinence.      He is currently on:   Over-the-counter ibuprofen 200 mg p.r.n. but infrequently, once every couple weeks.  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    No past medical history on file.     Review of patient's allergies indicates:  No Known Allergies     Review of Systems   Constitutional:  Negative for chills and fever.   Eyes:  Negative for visual disturbance.   Respiratory:  Negative for shortness of breath.    Cardiovascular:  Negative for chest pain.   Gastrointestinal:  Negative for blood in stool, constipation, nausea and vomiting.   Genitourinary:  Negative for difficulty urinating.   Musculoskeletal:  Positive for  arthralgias, back pain, gait problem and neck pain.   Neurological:  Negative for dizziness, weakness and headaches.   Psychiatric/Behavioral:  Negative for behavioral problems and sleep disturbance.              Objective:      Physical Exam  Vitals reviewed.   Constitutional:       General: He is not in acute distress.     Appearance: He is well-developed.   HENT:      Head: Normocephalic and atraumatic.   Musculoskeletal:      Cervical back: Normal range of motion.      Comments: BUE:  ROM:   RUE: full.   LUE: full.  Strength:    RUE: 5/5 at shoulder abduction, 5 elbow flexion, 5 elbow extension, 5 hand .   LUE: 5/5 at shoulder abduction, 5 elbow flexion, 5 elbow extension, 5 hand .  Sensation to pinprick:   RUE: intact.   LUE: intact.  DTR:    RUE: +2 biceps, +2 triceps.   LUE:  +2 biceps, +2 triceps.      BLE:  ROM:   RLE: full.   LLE: full.  Knee crepitus:   RLE: -ve.   LLE: -ve.   Strength:    RLE: 5/5 at hip flexion, 5 knee extension, 5 ankle DF, 5 PF, 5 EHL.   LLE: 5/5 at hip flexion, 5 knee extension, 5 ankle DF, 5 PF, 5 EHL.  Sensation to pinprick:     RLE: intact.      LLE: intact.   DTR:     RLE: +2 knee, +2 ankle.    LLE: +2 knee, +2 ankle.  Clonus:    Rt ankle: -ve.    Lt ankle: -ve.  SLR:      RLE: -ve at 60 degrees with tight hamstrings.     LLE: -ve at 60 degrees with tight hamstrings.  LEXUS:     RLE: mildly +ve.      LLE: -ve.  SI joint tenderness:     RLE: +ve proximally.      LLE: -ve.  Gilet's test:     RLE: -ve.      LLE: -ve.  Pain with hip Flexion Adduction Internal Rotation:     RLE: -ve.      LLE: -ve.  Pain with forced internal hip rotation:     RLE: mildy +ve.      LLE: -ve.  Pain with resisted hip abduction in sitting position:     RLE: -ve.      LLE: -ve.        -ve tenderness over lumbar spine.  No leg length discrepancy.    Directional Preference:  Spine flexion: 90 degrees , mild pain in back.  Spine extension: 20 degrees, mild pain in back.  Lateral bending: mild pain to  Right, mild pain to Left.      Heel walking: WNL.  Toe walking: WNL.  Gait: WNL     Skin:     General: Skin is warm.   Neurological:      Mental Status: He is alert.   Psychiatric:         Behavior: Behavior normal.         Assessment:       1. Chronic right hip pain    2. Chronic right-sided low back pain without sciatica    3. Hamstring tightness of right lower extremity        Plan:         - X-Ray Lumbar Spine Ap Lateral w/Flex Ext; Future (to check for lumbar spine alignment, stability and degenerative changes).  He was told there is a chance that his right buttock and hip pain referred from the spine.  - Start meloxicam (MOBIC) 15 MG tablet; Take 1 tablet (15 mg total) by mouth once daily.  - Start acetaminophen (TYLENOL) 500 MG tablet; Take 1-2 tablets (500-1,000 mg total) by mouth 3 (three) times daily as needed for Pain.  - Physical therapy referral.  - If he has no relief, MRI of the right hip may be obtained to check for intrinsic pathology including labrum.  - Follow up in about 4 months (around 5/9/2025).      This was a 45 minute visit, 50% of which was spent educating the patient about the diagnosis and the treatment plan.    This note was partly generated with [employer] voice recognition software. I apologize for any possible typographical errors.

## 2025-01-17 ENCOUNTER — CLINICAL SUPPORT (OUTPATIENT)
Dept: REHABILITATION | Facility: OTHER | Age: 35
End: 2025-01-17
Payer: COMMERCIAL

## 2025-01-17 DIAGNOSIS — M54.50 CHRONIC RIGHT-SIDED LOW BACK PAIN WITHOUT SCIATICA: ICD-10-CM

## 2025-01-17 DIAGNOSIS — M62.89 HAMSTRING TIGHTNESS OF RIGHT LOWER EXTREMITY: ICD-10-CM

## 2025-01-17 DIAGNOSIS — G89.29 CHRONIC RIGHT HIP PAIN: ICD-10-CM

## 2025-01-17 DIAGNOSIS — M25.551 CHRONIC RIGHT HIP PAIN: ICD-10-CM

## 2025-01-17 DIAGNOSIS — R29.898 RIGHT LEG WEAKNESS: Primary | ICD-10-CM

## 2025-01-17 DIAGNOSIS — G89.29 CHRONIC RIGHT-SIDED LOW BACK PAIN WITHOUT SCIATICA: ICD-10-CM

## 2025-01-17 PROCEDURE — 97161 PT EVAL LOW COMPLEX 20 MIN: CPT | Mod: PN

## 2025-01-17 NOTE — PROGRESS NOTES
[facility]  Physical Therapy Initial Evaluation    Name: Oscar Nagy  Clinic Number: [id number]    Therapy Diagnosis:   Encounter Diagnoses   Name Primary?    Chronic right hip pain     Chronic right-sided low back pain without sciatica     Hamstring tightness of right lower extremity     Right leg weakness Yes     Physician: January Loredo MD    Physician Orders: Physical Therapy Evaluate and Treat  Medical Diagnosis from Referral: Chronic right hip pain  Evaluation Date: 1/17/25  Authorization Period Expiration: 1/9/26  Plan of Care Expiration: 1/17/2025 to 4/17/25  Visit # / Visits authorized: 1/1 (pending additional authorization following initial evaluation)   FOTO: 0/3  on 1/17/25      Time In: 300p  Time Out: 335p  Total Billable Time: 35 minutes    Precautions: standard    Subjective     History of current condition - Oscar reports: 2-3 years right lumbar spine and right hip pain    Right lower extremity weakness denys with walking/running, gym workouts (squats)    Pain travels from right lumbar spine to right knee    Patient reports increased right hip N&T with prolonged sitting         Medical History:   No past medical history on file.    Surgical History:   Oscar Nagy  has no past surgical history on file.    Medications:   Oscar has a current medication list which includes the following prescription(s): acetaminophen, amlodipine, fluticasone propionate, meloxicam, and rosuvastatin.    Allergies:   Review of patient's allergies indicates:  No Known Allergies     Imaging: FINDINGS:  Alignment: Alignment is maintained.     Vertebrae: Vertebral body heights are maintained.     Discs and facets: Disc heights are maintained. Facet joints are grossly unremarkable.     Miscellaneous: No instability or malalignment seen with flexion or extension.    Prior Therapy: no  Social History: 35 yo male  Occupation:  - [employer]  Prior Level of Function: no  limitation  Current Level of Function: limited with recreational activity    Pain:  Current 1/10, worst 4/10, best 1/10   Location: right lower extremity   Description: Aching, Tingling, and Numb  Aggravating Factors: running,   Easing Factors: rest    Pts goals: Pt would like to return to running/gym workouts with no increase in right hip/lower extremity pain.    Objective     WNL=within normal limits  WFL=within functional limits  NT=not tested  *=pain    Posture: WNL  Palpation: tenderness to palpation at T10-12, L1-2  Sensation: intact  Deep tendon reflexes: NT    Lumbar Active range of motion (%) Pain/dysfunction with movement:   Flexion No limitation    Extension limited    Right side bending Not limited    Left side bending Limited    Right rotation Not limited    Left rotation Not limited          Right LE   Left LE      Iliopsoas:  L2 3+/5 Iliopsoas: L2 4+/5    Quadriceps:  L3 - femoral nerve 4/5 Quadriceps: L3 - femoral nerve 4+/5    Hip adduction:  L3 - obterator 4/5 Hip adduction: L3 - obterator 4+/5    Hamstrings:  S2 4+/5 Hamstrings: S2 5/5    Ankle DF/EV:  L4 4/5 Ankle DF/EV: L4 5/5    GT Ext:  L5 4/5 GT Ext L5 5/5    Hip Abduction:  L5-S1 - Superior gluteal nerve 3+/5  Hip Abduction: L5-S1 - Superior gluteal nerve 4+/5    Hip extension:  L5-S2 - Inferior gluteal nerve 4/5 Hip extension: L5-S2 - Inferior gluteal nerve 4+/5    Ankle PF:   S1 - tibial nerve NT Ankle PF S1 - tibial nerve NT         Slump R: negative  Slump L: + for right low back pain     SLR R: negative  SLR L: negative      Joint mobility:   Thoracic: hypermobility with PA segmental mobility assessment.   Lumbar: hypermobility with PA segmental mobility assessment.      SIJ Special Tests:  Test Right Left   Thigh thrust (+) (--)   Tenderness to palpation at SIJ (--) (--)   Gaenslen's test (--) (--)   Distraction (--) (--)   Compression (--) (--)            CMS Impairment/Limitation/Restriction for FOTO Survey    Therapist reviewed  FOTO scores for Oscar Nagy on 1/17/2025.   FOTO documents entered into [employer] - see Media section.    Limitation Score: 39%  Predicted Goal: 23%    Category: Mobility     TREATMENT     Home exercise program prescribed: see attached      Home Exercises and Patient Education Provided:    Education provided:   - Findings; prognosis and plan of care (POC)  - Home exercise program (HEP)  - Modality options  - Therapist contact information    Written Home Exercises Provided: Yes  Exercises were reviewed and Oscar was able to demonstrate them prior to the end of the session.  Oscar demonstrated good understanding of the education provided.       Assessment     Oscar is a 34 y.o. male referred to outpatient Physical Therapy with a medical diagnosis of chronic right hip pain. Pt presents to PT with pain, decreased hip ROM, decreased strength and flexibility, poor posture, and functional deficits with recreational activity. These deficits are negatively impacting this patient's ability to complete their work duties and activities of daily living.     Pt prognosis is Good.   Pt will benefit from skilled outpatient Physical Therapy to address the deficits stated above and in the chart below, provide pt/family education, and to maximize pt's level of independence.     Plan of care discussed with patient: Yes  Pt's spiritual, cultural and educational needs considered and pt agreeable to plan of care and goals as stated below:     Anticipated Barriers for therapy: None      Medical Necessity is demonstrated by the following  History  Co-morbidities and personal factors that may impact the plan of care Co-morbidities:   NA    Personal Factors:   no deficits     low   Examination  Body Structures and Functions, activity limitations and participation restrictions that may impact the plan of care Body Regions:   back  lower extremities    Body Systems:    ROM  motor control    Participation Restrictions:   Walking    Activity  limitations:   Learning and applying knowledge  no deficits    General Tasks and Commands  No Deficits    Communication  No Deficits    Mobility  walking    Self care  no deficits    Domestic Life  No Deficits    Interactions/Relationships  No Deficits    Life Areas  No Deficits    Community and Social Life  No Deficits         low   Clinical Presentation stable and uncomplicated low   Decision Making/ Complexity Score: low     GOALS:  Short Term Goals (4 Weeks):  1. Patient will be compliant with home exercise program to supplement therapy in promoting functional mobility. (progressing, not met)    2. Patient will perform bending with good control to demonstrate improved core strength. (progressing, not met)    3. Patient will report no pain during thoracolumbar active range of motion to promote functional mobility.  (progressing, not met)    4. Patient will improve impaired lower extremity 2manual muscle tests  to >/=4/5 to improve strength for functional tasks. (progressing, not met)        Long Term Goals (6 Weeks):   1. Patient will improve FOTO score to </= 23% limited to decrease perceived limitation with maintaining/changing body position. (progressing, not met)    2. Patient will perform running with good control to demonstrate improved core strength.  (progressing, not met)    3. Patient will improve impaired lower extremity manual muscle tests to >/=4+/5 to improve strength for functional tasks.  (progressing, not met)    4. Patient will tolerate running for 20 minutes with no increase in low back pain to return to PLOF.  (progressing, not met)          Plan     Plan of care Certification: 1/17/2025 to 4/17/25    Outpatient Physical Therapy 1 times weekly for 12 weeks to include the following interventions: Therapeutic Exercises, Manual Therapeutic Technique, Neuromuscular Re Education, Therapeutic Activities. Modalities, Kinesiotape prn, and Functional Dry Needling as needed.    Guanakito Villeda, PT,  DPT,  OCS

## 2025-01-24 PROBLEM — R29.898 RIGHT LEG WEAKNESS: Status: ACTIVE | Noted: 2025-01-24

## 2025-02-17 ENCOUNTER — OFFICE VISIT (OUTPATIENT)
Dept: OTOLARYNGOLOGY | Facility: CLINIC | Age: 35
End: 2025-02-17
Payer: COMMERCIAL

## 2025-02-17 VITALS
HEART RATE: 87 BPM | BODY MASS INDEX: 27.45 KG/M2 | WEIGHT: 231.5 LBS | SYSTOLIC BLOOD PRESSURE: 144 MMHG | DIASTOLIC BLOOD PRESSURE: 89 MMHG

## 2025-02-17 DIAGNOSIS — J34.2 DEVIATED NASAL SEPTUM: ICD-10-CM

## 2025-02-17 DIAGNOSIS — J34.89 NASAL OBSTRUCTION WITHOUT CHOANAL ATRESIA: Primary | ICD-10-CM

## 2025-02-17 DIAGNOSIS — G47.30 SLEEP-DISORDERED BREATHING: ICD-10-CM

## 2025-02-17 DIAGNOSIS — J34.829 INCOMPETENT NASAL VALVE: ICD-10-CM

## 2025-02-17 DIAGNOSIS — J34.3 HYPERTROPHY OF INFERIOR NASAL TURBINATE: ICD-10-CM

## 2025-02-17 PROCEDURE — 99213 OFFICE O/P EST LOW 20 MIN: CPT | Mod: S$GLB,,, | Performed by: PHYSICIAN ASSISTANT

## 2025-02-17 NOTE — PROGRESS NOTES
Subjective:      Oscar is a 35 y.o. male who comes for follow-up of nasal obstruction.  His last visit with me was on 12/3/2024.      Patient reports improvement in his left-sided nasal obstruction but symptoms have persisted.  Patient reports compliance with Flonase 2 sprays each nostril daily for the last several weeks.      Per last office visit 12/3/2024 :  Patient reports chronic left-sided nasal obstruction with associated hyposmia for as long as he can remember.  He reports usually able to smell most smells but at times people nearby or able to smell certain odors that he can not.  Patient has not tried any nasal sprays in the past and denies any trauma to his nose.  Patient does not suffer from rhinorrhea or sneezing on a regular basis.  He is tried breathe right strips in the past with mild improvement of symptoms.     He has a history of snoring and excessive daytime fatigue.  Sleep study ordered on 8/9/24 by Dr. Sen Metz and he reports results were negative for LESLIE.     Current sinonasal medications include none at present.      He denies a diagnosis of obstructive sleep apnea.   He does not recall a prior history of nasal trauma.  He has not had sinonasal surgery.      1. Nasal obstruction without choanal atresia  3. Deviated nasal septum  4. Hypertrophy of inferior nasal turbinate  5. Incompetent nasal valve  - nasal endoscopy without evidence of polyps, masses, or purulence.  Significant left deviation noted.  - Prescribed patient to START fluticasone propionate and also educated patient on how to properly use nasal sprays   - briefly discussed surgical intervention   2. Sleep-disordered breathing  -     negative sleep test for LESLIE per patient  -Encouraged obtained results and uploading to chart    The patient's medications, allergies, past medical, surgical, social and family histories were reviewed and updated as appropriate.    A detailed review of systems was obtained with pertinent positives  "as per the above HPI, and otherwise negative.        Objective:     BP (!) 144/89 (BP Location: Right arm, Patient Position: Sitting)   Pulse 87   Wt 105 kg (231 lb 7.7 oz)   BMI 27.45 kg/m²      Physical Exam  Vitals reviewed.   Constitutional:       Appearance: Normal appearance.   HENT:      Head: Normocephalic and atraumatic.      Right Ear: External ear normal.      Left Ear: External ear normal.      Nose: Septal deviation present.      Right Turbinates: Enlarged.      Left Turbinates: Enlarged.      Right Sinus: No maxillary sinus tenderness or frontal sinus tenderness.      Left Sinus: No maxillary sinus tenderness or frontal sinus tenderness.   Eyes:      Conjunctiva/sclera: Conjunctivae normal.   Pulmonary:      Effort: Pulmonary effort is normal.   Neurological:      Mental Status: He is alert.          Procedure    None    Data Reviewed    WBC (K/uL)   Date Value   02/06/2024 8.51     Eosinophil % (%)   Date Value   02/06/2024 1.1     Eos # (K/uL)   Date Value   02/06/2024 0.1     Platelets (K/uL)   Date Value   02/06/2024 299     Glucose (mg/dL)   Date Value   02/06/2024 92     No results found for: "IGE"    Assessment:     1. Nasal obstruction without choanal atresia    2. Deviated nasal septum    3. Hypertrophy of inferior nasal turbinate    4. Incompetent nasal valve    5. Sleep-disordered breathing         Plan:     Patient has failed medical therapy  He is interested in surgical intervention.  I discussed expectations for septoplasty and turbinate reduction.  May benefit from nasal valve reconstruction as well. Scheduled patient with surgeon.     He will follow up as needed  I had a discussion with the patient regarding his condition and the further workup and management options.    All questions were answered, and the patient is in agreement with the above.     Disclaimer:  This note may have been prepared utilizing voice recognition software which may result in occasional typographical errors " in the text such as sound alike words.   If further clarification is needed, please contact the ENT department of [facility].

## 2025-02-19 ENCOUNTER — OFFICE VISIT (OUTPATIENT)
Dept: OTOLARYNGOLOGY | Facility: CLINIC | Age: 35
End: 2025-02-19
Payer: COMMERCIAL

## 2025-02-19 VITALS
SYSTOLIC BLOOD PRESSURE: 135 MMHG | DIASTOLIC BLOOD PRESSURE: 94 MMHG | BODY MASS INDEX: 27.37 KG/M2 | WEIGHT: 230.81 LBS | HEART RATE: 83 BPM

## 2025-02-19 DIAGNOSIS — J34.3 NASAL TURBINATE HYPERTROPHY: ICD-10-CM

## 2025-02-19 DIAGNOSIS — J34.89 NASAL OBSTRUCTION: ICD-10-CM

## 2025-02-19 DIAGNOSIS — J34.2 NASAL SEPTAL DEVIATION: Primary | ICD-10-CM

## 2025-02-19 NOTE — PROGRESS NOTES
Mr. Nagy     Vitals:    25 1544   BP: (!) 135/94   Pulse: 83       Chief Complaint:  Surgery consult       HPI:   is a 35-year-old white male who presents referred by my colleague RICARDO Zavala for nasal obstruction.  He denies any history of nasal trauma.  He has been using his prescribed saline sinus rinses as well as nasal steroid sprays which has somewhat improve his symptoms however he still feels that he is not getting good nasal airflow from his left side.    SNOT22- 47 NOSE- 95    Review of Systems:  Constitutional:   weight loss or weight gain: Negative  Allergy/Immunologic:   Negative  Nasal Congestion/Obstruction:   Positive for nasal obstruction as above  Nosebleeds:   Negative  Sinus infections:   Negative  Headache/Facial Pain:   Negative  Snoring/LESLIE:   Positive for sleep disturbance however previous sleep study did not indicate LESLIE.  Throat: Infections/Pain:   Negative  Hoarseness/Speech Disturbance:   Negative  Trauma Hx:  Negative    Cardiovascular:  M/I Angina: Negative  Hypertension:  Positive  Endocrine:    DM/Steroids: Negative  GI:   Dysphagia/Reflux: Negative  :   GYN Pregnancy: Negative  Renal:   Dialysis: Negative  Lymphatic:   Neck Mass/Lymphadenopathy: Negative  Muscoloskeletal:   Negative  Hematologic:   Bleeding Disorders/Anemia: Negative  Neurologic:    Cranial/Neuralgia: Negative  Pulmonary:   Asthma/SOB/Cough: Negative  Skin Disorders: Negative    Past Medical/Surgical/Family/Social History:    ENT Surgery: Negative  Occupational Exposure: Negative   Problems: Negative  Cancer: Negative    Past Family History:   Family history of Cancer: Negative    Past Social History:   Tobacco: Nonsmoker   Alcohol:  Non Drinker      Allergies and medications: Reviewed per med card.    Physical Examination:  Ears:   External auditory canals:  Clear   Hearing: Grossly intact   Tympanic Membranes: Clear  Nose:   External: Normal with good valve support   Intranasal:  Septal  deviation to the left with 2+ turbinates creating 75% obstruction.  Mouth:   Intraorally: Lips, teeth, and gums: Normal   Oropharynx: Normal   Mucosa: Normal   Tongue: Normal  Throat:      Palate: Normal palate with elevation, Mallampati 1   Tonsils:  Minimal imbedded   Posterior Pharynx: Normal  Fiberoptic exam: Not performed  Head/Face:     Inspection: Normal and atraumatic   Palpation/Percussion: Non tender   Facial strength: Normal and symmetric   Salivary glands: Normal  Neck: Supple  Thyroid: No masses  Lymphatics: No nodes  Respiratory:   Effort: Normal  Eyes:   Ocular Mobility: Normal   Vision: Grossly intact  Neuro/Psych:   Cranial Nerves: Grossly Intact   Orientation: Normal   Mood/Affect: Normal      Assessment/Plan:  I have discussed my findings with him in detail as well as my recommendations for treatment.  I have encouraged him to continue with his saline sinus rinses and nasal steroid sprays.  Surgically we discussed that he could benefit from septoplasty and submucous resection of turbinates.  I have discussed the pros and cons risks and benefits as well as technical aspects of this procedure in detail with him.  He understands and accepts these and will consider this information let us know of his decision.

## 2025-07-13 DIAGNOSIS — I10 HYPERTENSION, UNSPECIFIED TYPE: ICD-10-CM

## 2025-07-13 DIAGNOSIS — E78.1 HYPERTRIGLYCERIDEMIA: ICD-10-CM

## 2025-07-13 NOTE — TELEPHONE ENCOUNTER
Care Due:                  Date            Visit Type   Department     Provider  --------------------------------------------------------------------------------                                EP -                              PRIMARY      [facility]  Last Visit: 10-      CARE ([facility])   PRIMARY CARE   Oscar Kwok  Next Visit: None Scheduled  None         None Found                                                            Last  Test          Frequency    Reason                     Performed    Due Date  --------------------------------------------------------------------------------    CMP.........  12 months..  rosuvastatin.............  02- 01-    Lipid Panel.  12 months..  rosuvastatin.............  02- 01-    [employer] Embedded Care Due Messages. Reference number: [id number].   7/13/2025 7:35:18 AM CDT   The patient was advised to continue present regimen

## 2025-07-14 NOTE — TELEPHONE ENCOUNTER
Refill Routing Note   Medication(s) are not appropriate for processing by [facility] for the following reason(s):        Required vitals abnormal: amlodipine  Required labs outdated: rosuvastatin    [facility] action(s):  Defer   Requires labs : Yes             Appointments  past 12m or future 3m with PCP    Date Provider   Last Visit   10/4/2024 Oscar Kwok MD   Next Visit   Visit date not found Oscar Kwok MD   ED visits in past 90 days: 0        Note composed:6:42 PM 07/14/2025

## 2025-07-15 RX ORDER — ROSUVASTATIN CALCIUM 10 MG/1
10 TABLET, COATED ORAL
Qty: 90 TABLET | Refills: 0 | OUTPATIENT
Start: 2025-07-15

## 2025-07-15 RX ORDER — AMLODIPINE BESYLATE 10 MG/1
10 TABLET ORAL
Qty: 90 TABLET | Refills: 0 | OUTPATIENT
Start: 2025-07-15

## 2025-07-31 NOTE — TELEPHONE ENCOUNTER
Provider Staff:  Please note Refusal of medication.     Action required for this patient.      Requested Prescriptions     Refused Prescriptions Disp Refills    amLODIPine (NORVASC) 10 MG tablet [Pharmacy Med Name: AMLODIPINE BESYLATE 10 MG TAB] 90 tablet 0     Sig: TAKE 1 TABLET BY MOUTH EVERY DAY     Refused By: ANÍBAL SHORT     Reason for Refusal: Other (Needs pending bloodwork done)    rosuvastatin (CRESTOR) 10 MG tablet [Pharmacy Med Name: ROSUVASTATIN CALCIUM 10 MG TAB] 90 tablet 0     Sig: TAKE 1 TABLET BY MOUTH EVERY DAY     Refused By: ANÍBAL SHORT     Reason for Refusal: Other (See comments)      Thanks!  [facility]   Note composed: 07/30/2025 11:31 PM

## 2025-08-05 DIAGNOSIS — I10 HYPERTENSION, UNSPECIFIED TYPE: ICD-10-CM

## 2025-08-05 DIAGNOSIS — E78.1 HYPERTRIGLYCERIDEMIA: ICD-10-CM

## 2025-08-05 RX ORDER — AMLODIPINE BESYLATE 10 MG/1
10 TABLET ORAL DAILY
Qty: 90 TABLET | Refills: 0 | Status: SHIPPED | OUTPATIENT
Start: 2025-08-05 | End: 2026-08-05

## 2025-08-05 RX ORDER — ROSUVASTATIN CALCIUM 10 MG/1
10 TABLET, COATED ORAL DAILY
Qty: 90 TABLET | Refills: 0 | Status: SHIPPED | OUTPATIENT
Start: 2025-08-05